# Patient Record
Sex: MALE | Race: WHITE | HISPANIC OR LATINO | Employment: FULL TIME | URBAN - NONMETROPOLITAN AREA
[De-identification: names, ages, dates, MRNs, and addresses within clinical notes are randomized per-mention and may not be internally consistent; named-entity substitution may affect disease eponyms.]

---

## 2022-08-15 ENCOUNTER — APPOINTMENT (EMERGENCY)
Dept: RADIOLOGY | Facility: HOSPITAL | Age: 22
End: 2022-08-15
Payer: OTHER MISCELLANEOUS

## 2022-08-15 ENCOUNTER — HOSPITAL ENCOUNTER (EMERGENCY)
Facility: HOSPITAL | Age: 22
Discharge: HOME | End: 2022-08-16
Attending: STUDENT IN AN ORGANIZED HEALTH CARE EDUCATION/TRAINING PROGRAM
Payer: OTHER MISCELLANEOUS

## 2022-08-15 VITALS
DIASTOLIC BLOOD PRESSURE: 81 MMHG | SYSTOLIC BLOOD PRESSURE: 132 MMHG | TEMPERATURE: 97.6 F | HEART RATE: 84 BPM | RESPIRATION RATE: 20 BRPM | OXYGEN SATURATION: 99 %

## 2022-08-15 DIAGNOSIS — S90.32XA CONTUSION OF LEFT FOOT, INITIAL ENCOUNTER: ICD-10-CM

## 2022-08-15 DIAGNOSIS — S97.82XA CRUSHING INJURY OF LEFT FOOT, INITIAL ENCOUNTER: ICD-10-CM

## 2022-08-15 DIAGNOSIS — S90.812A ABRASION, LEFT FOOT, INITIAL ENCOUNTER: ICD-10-CM

## 2022-08-15 DIAGNOSIS — S91.311A LACERATION OF RIGHT FOOT, INITIAL ENCOUNTER: Primary | ICD-10-CM

## 2022-08-15 PROCEDURE — 90715 TDAP VACCINE 7 YRS/> IM: CPT | Performed by: STUDENT IN AN ORGANIZED HEALTH CARE EDUCATION/TRAINING PROGRAM

## 2022-08-15 PROCEDURE — 13132 CMPLX RPR F/C/C/M/N/AX/G/H/F: CPT | Performed by: STUDENT IN AN ORGANIZED HEALTH CARE EDUCATION/TRAINING PROGRAM

## 2022-08-15 PROCEDURE — 73630 X-RAY EXAM OF FOOT: CPT

## 2022-08-15 PROCEDURE — 99284 EMERGENCY DEPT VISIT MOD MDM: CPT

## 2022-08-15 PROCEDURE — 99284 EMERGENCY DEPT VISIT MOD MDM: CPT | Performed by: STUDENT IN AN ORGANIZED HEALTH CARE EDUCATION/TRAINING PROGRAM

## 2022-08-15 PROCEDURE — 90471 IMMUNIZATION ADMIN: CPT

## 2022-08-15 RX ORDER — CEPHALEXIN 500 MG/1
500 CAPSULE ORAL EVERY 6 HOURS SCHEDULED
Qty: 20 CAPSULE | Refills: 0 | Status: SHIPPED | OUTPATIENT
Start: 2022-08-15 | End: 2022-08-20

## 2022-08-15 RX ORDER — CEPHALEXIN 250 MG/1
500 CAPSULE ORAL ONCE
Status: COMPLETED | OUTPATIENT
Start: 2022-08-15 | End: 2022-08-15

## 2022-08-15 RX ORDER — ACETAMINOPHEN 325 MG/1
975 TABLET ORAL ONCE
Status: COMPLETED | OUTPATIENT
Start: 2022-08-15 | End: 2022-08-15

## 2022-08-15 RX ORDER — BACITRACIN, NEOMYCIN, POLYMYXIN B 400; 3.5; 5 [USP'U]/G; MG/G; [USP'U]/G
1 OINTMENT TOPICAL ONCE
Status: COMPLETED | OUTPATIENT
Start: 2022-08-15 | End: 2022-08-15

## 2022-08-15 RX ADMIN — BACITRACIN ZINC, NEOMYCIN, POLYMYXIN B 1 LARGE APPLICATION: 400; 3.5; 5 OINTMENT TOPICAL at 23:53

## 2022-08-15 RX ADMIN — ACETAMINOPHEN 975 MG: 325 TABLET ORAL at 21:50

## 2022-08-15 RX ADMIN — CEPHALEXIN 500 MG: 250 CAPSULE ORAL at 23:53

## 2022-08-15 RX ADMIN — TETANUS TOXOID, REDUCED DIPHTHERIA TOXOID AND ACELLULAR PERTUSSIS VACCINE, ADSORBED 0.5 ML: 5; 2.5; 8; 8; 2.5 SUSPENSION INTRAMUSCULAR at 21:51

## 2022-08-16 NOTE — DISCHARGE INSTRUCTIONS
There were no signs of fracture or dislocation on the xray  You are being prescribed a course of Keflex which is an antibiotic  Please take as directed  For pain, you can take Motrin 600 mg every 6 hours and Tylenol 1000 mg every 6 hours  Try to keep the sutures clean/dry for the first 24 hours  After that, you can gently since the area with soap and water  A referral to Podiatry was provided  Expect a phone call within the next few days to set up the appointment  Do not hesitate to be re-evaluated in the ED for any concerning signs or symptoms such as increased pain/bleeding/redness, fever/chills

## 2022-08-16 NOTE — ED PROVIDER NOTES
History  Chief Complaint   Patient presents with    Foot Injury     Pt was at work and a tow motor ran over left foot, skin tears on left lower leg, actively bleeding, no deformity, swelling present       Foot Laceration  Location:  Foot  Foot laceration location:  L toes  Depth: Through muscle  Quality: jagged    Bleeding: controlled    Time since incident:  1 hour  Laceration mechanism:  Blunt object  Pain details:     Quality:  Throbbing    Severity:  Moderate    Timing:  Constant    Progression:  Unchanged  Foreign body present:  No foreign bodies  Relieved by:  None tried  Worsened by: Movement and pressure  Ineffective treatments:  None tried  Tetanus status:  Unknown  Associated symptoms: no focal weakness, no numbness and no rash       57-year-old male  Previously healthy  Presents to the emergency department with left foot pain/laceration status post work-related injury  He states that his foot was run over by a forklift  The patient was wearing sneakers  The injury occurred approximately 1 hour prior to arrival   Unknown if tetanus vaccine is up-to-date  The patient currently expresses throbbing, 8/10 pain  Is able to move his foot/toes  History reviewed  No pertinent past medical history  History reviewed  No pertinent surgical history  History reviewed  No pertinent family history  I have reviewed and agree with the history as documented  E-Cigarette/Vaping    E-Cigarette Use Never User      E-Cigarette/Vaping Substances     Social History     Tobacco Use    Smoking status: Never Smoker    Smokeless tobacco: Never Used   Vaping Use    Vaping Use: Never used   Substance Use Topics    Alcohol use: Not Currently    Drug use: Not Currently     Review of Systems   Musculoskeletal: Positive for arthralgias, gait problem, joint swelling and myalgias  Negative for back pain, neck pain and neck stiffness  Skin: Positive for color change and wound  Negative for pallor and rash  Neurological: Negative for focal weakness  Hematological: Does not bruise/bleed easily  All other systems reviewed and are negative  Physical Exam  Physical Exam  Vitals and nursing note reviewed  Constitutional:       General: He is not in acute distress  Appearance: He is not ill-appearing or toxic-appearing  HENT:      Head: Normocephalic and atraumatic  Right Ear: External ear normal       Left Ear: External ear normal       Nose: No congestion or rhinorrhea  Eyes:      General:         Right eye: No discharge  Left eye: No discharge  Extraocular Movements: Extraocular movements intact  Conjunctiva/sclera: Conjunctivae normal       Pupils: Pupils are equal, round, and reactive to light  Cardiovascular:      Rate and Rhythm: Normal rate and regular rhythm  Pulses: Normal pulses  Heart sounds: Normal heart sounds  No murmur heard  Pulmonary:      Effort: Pulmonary effort is normal  No respiratory distress  Breath sounds: Normal breath sounds  No stridor  No wheezing, rhonchi or rales  Chest:      Chest wall: No tenderness  Abdominal:      General: Abdomen is flat  Bowel sounds are normal  There is no distension  Palpations: Abdomen is soft  There is no mass  Tenderness: There is no abdominal tenderness  There is no right CVA tenderness, left CVA tenderness, guarding or rebound  Hernia: No hernia is present  Musculoskeletal:         General: Swelling, tenderness and signs of injury present  No deformity  Cervical back: Neck supple  No tenderness  Right lower leg: No edema  Left lower leg: No edema  Feet:       Comments: 3 5 cm laceration that extends along the lateral aspect of the left great toe to the plantar aspect of the left foot  The laceration extends into the muscle  Bleeding controlled  No foreign bodies noted  The left foot is neurovascularly intact  Normal capillary refill      There is a contusion along the dorsal aspect of the left distal foot  Abrasion along the proximal foot (dorsum)  Distal pulses palpated  Skin:     General: Skin is warm and dry  Capillary Refill: Capillary refill takes less than 2 seconds  Coloration: Skin is not jaundiced or pale  Findings: Bruising present  No erythema, lesion or rash  Neurological:      General: No focal deficit present  Mental Status: He is alert and oriented to person, place, and time  Cranial Nerves: No cranial nerve deficit  Sensory: No sensory deficit  Motor: No weakness  Psychiatric:         Mood and Affect: Mood normal          Behavior: Behavior normal          Thought Content: Thought content normal          Judgment: Judgment normal        Vital Signs  ED Triage Vitals   Temperature Pulse Respirations Blood Pressure SpO2   08/15/22 2047 08/15/22 2047 08/15/22 2047 08/15/22 2047 08/15/22 2047   97 6 °F (36 4 °C) 84 20 132/81 99 %      Temp Source Heart Rate Source Patient Position - Orthostatic VS BP Location FiO2 (%)   08/15/22 2047 08/15/22 2047 -- 08/15/22 2047 --   Temporal Monitor  Right arm       Pain Score       08/15/22 2150       5           Vitals:    08/15/22 2047   BP: 132/81   Pulse: 84     ED Medications  Medications   tetanus-diphtheria-acellular pertussis (BOOSTRIX) IM injection 0 5 mL (0 5 mL Intramuscular Given 8/15/22 2151)   acetaminophen (TYLENOL) tablet 975 mg (975 mg Oral Given 8/15/22 2150)   cephalexin (KEFLEX) capsule 500 mg (500 mg Oral Given 8/15/22 2353)   neomycin-bacitracin-polymyxin b (NEOSPORIN) ointment 1 large application (1 large application Topical Given 8/15/22 2353)     Diagnostic Studies  Results Reviewed     None             XR foot 3+ views LEFT   ED Interpretation by Reyna Pollock DO (08/15 2155)   No acute fracture/dislocation noted on plain film              Procedures  Laceration repair    Date/Time: 8/15/2022 11:15 PM  Performed by:  Reyna Pollock DO  Authorized by: Cheo Murcia DO   Consent: Verbal consent obtained  Consent given by: patient  Patient understanding: patient states understanding of the procedure being performed  Site marked: the operative site was marked  Body area: lower extremity  Location details: left foot  Laceration length: 3 5 cm  Tendon involvement: none  Nerve involvement: none  Anesthesia: local infiltration    Anesthesia:  Local Anesthetic: lidocaine 1% with epinephrine    Sedation:  Patient sedated: no      Wound Dehiscence:    Secondary closure or dehiscence: complex    Procedure Details:  Preparation: Patient was prepped and draped in the usual sterile fashion  Irrigation solution: tap water  Irrigation method: syringe  Amount of cleaning: standard  Debridement: none  Degree of undermining: none  Skin closure: Ethilon  Number of sutures: 9  Technique: simple  Approximation: close  Approximation difficulty: complex  Dressing: antibiotic ointment and non-adhesive packing strip  Patient tolerance: patient tolerated the procedure well with no immediate complications        ED Course  ED Course as of 08/16/22 0321   Tue Aug 16, 2022   0001 The laceration along the 1st and 2nd digit webbing/plantar aspect of the left foot was repaired with sutures  See procedural note above  No signs of acute fracture noted on plain film  The patient's left foot remained neurovascularly intact s/p laceration repair  Tetanus booster updated  The patient was prescribed a course Keflex  OP Podiatry referral provided  Given the location and type of injury, the patient was placed into a surgical shoe following the suture repair  Other supportive care measures and return precautions were discussed with the patient  He expressed understanding  All questions were addressed  The patient was stable for discharge       MDM    Disposition  Final diagnoses:   Laceration of right foot, initial encounter - left foot laceration   Contusion of left foot, initial encounter Abrasion, left foot, initial encounter   Crushing injury of left foot, initial encounter     Time reflects when diagnosis was documented in both MDM as applicable and the Disposition within this note     Time User Action Codes Description Comment    8/15/2022 11:36 PM Elayne Blue Add [P24 100O] Laceration of right foot, initial encounter     8/15/2022 11:36 PM Jackie Anderson Add [S90 31XA] Contusion of right foot, initial encounter     8/16/2022  3:10 AM Jackie Andersontead Remove [S90 31XA] Contusion of right foot, initial encounter     8/16/2022  3:10 AM Elayne Blue Add [D66 528C] Laceration of left foot, initial encounter     8/16/2022  3:10 AM Elayne Blue Remove [Q06 284W] Laceration of left foot, initial encounter     8/16/2022  3:11 AM Elayne Blue Modify [W75 479Q] Laceration of right foot, initial encounter     8/16/2022  3:11 AM Elayne Blue Modify [Q58 662R] Laceration of right foot, initial encounter     8/16/2022  3:13 AM Elayne Blue Modify [R26 958K] Laceration of right foot, initial encounter left foot laceration    8/16/2022  3:13 AM Elayne Blue Add [S90 32XA] Contusion of left foot, initial encounter     8/16/2022  3:13 AM Elayne Blue Add [S90 812A] Abrasion, left foot, initial encounter     8/16/2022  3:14 AM Elayne Blue Add [R72 09OW] Crushing injury of left foot, initial encounter       ED Disposition     ED Disposition   Discharge    Condition   Stable    Date/Time   Mon Aug 15, 2022 11:40 PM    Comment   Keesha Quintanilla discharge to home/self care                 Follow-up Information     Follow up With Specialties Details Why Contact Alyce Proctor, [facility]   In 3 days  [address]  [phone]            Discharge Medication List as of 8/15/2022 11:42 PM      START taking these medications    Details   cephalexin (KEFLEX) 500 mg capsule Take 1 capsule (500 mg total) by mouth every 6 (six) hours for 5 days, Starting Mon 8/15/2022, Until Sat 8/20/2022, Print                 PDMP Review     None          ED Provider  Electronically Signed by           Cheo Murcia,   08/16/22 3924